# Patient Record
Sex: MALE | Race: WHITE | URBAN - METROPOLITAN AREA
[De-identification: names, ages, dates, MRNs, and addresses within clinical notes are randomized per-mention and may not be internally consistent; named-entity substitution may affect disease eponyms.]

---
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
## 2024-02-02 ENCOUNTER — HOSPITAL ENCOUNTER (EMERGENCY)
Age: 19
Discharge: HOME | End: 2024-02-02
Attending: EMERGENCY MEDICINE
Payer: COMMERCIAL

## 2024-02-02 VITALS
TEMPERATURE: 97 F | WEIGHT: 190 LBS | BODY MASS INDEX: 26.6 KG/M2 | HEIGHT: 71 IN | OXYGEN SATURATION: 99 % | RESPIRATION RATE: 16 BRPM | SYSTOLIC BLOOD PRESSURE: 135 MMHG | DIASTOLIC BLOOD PRESSURE: 72 MMHG | HEART RATE: 89 BPM

## 2024-02-02 DIAGNOSIS — S51.811A FOREARM LACERATION, RIGHT, INITIAL ENCOUNTER: Primary | ICD-10-CM

## 2024-02-02 PROCEDURE — 2500000003 HC RX 250 WO HCPCS: Performed by: EMERGENCY MEDICINE

## 2024-02-02 PROCEDURE — 99282 EMERGENCY DEPT VISIT SF MDM: CPT

## 2024-02-02 PROCEDURE — 12001 RPR S/N/AX/GEN/TRNK 2.5CM/<: CPT

## 2024-02-02 RX ORDER — LIDOCAINE HYDROCHLORIDE 10 MG/ML
5 INJECTION, SOLUTION EPIDURAL; INFILTRATION; INTRACAUDAL; PERINEURAL ONCE
Status: COMPLETED | OUTPATIENT
Start: 2024-02-02 | End: 2024-02-02

## 2024-02-02 RX ADMIN — LIDOCAINE HYDROCHLORIDE 5 ML: 10 INJECTION, SOLUTION EPIDURAL; INFILTRATION; INTRACAUDAL; PERINEURAL at 17:56

## 2024-02-02 ASSESSMENT — PAIN - FUNCTIONAL ASSESSMENT: PAIN_FUNCTIONAL_ASSESSMENT: NONE - DENIES PAIN

## 2024-02-12 ENCOUNTER — OFFICE VISIT (OUTPATIENT)
Dept: FAMILY MEDICINE CLINIC | Age: 19
End: 2024-02-12
Payer: COMMERCIAL

## 2024-02-12 VITALS
SYSTOLIC BLOOD PRESSURE: 130 MMHG | WEIGHT: 189.2 LBS | OXYGEN SATURATION: 99 % | HEART RATE: 82 BPM | TEMPERATURE: 96.6 F | DIASTOLIC BLOOD PRESSURE: 82 MMHG | BODY MASS INDEX: 26.39 KG/M2

## 2024-02-12 DIAGNOSIS — S51.811S LACERATION OF RIGHT FOREARM, SEQUELA: Primary | ICD-10-CM

## 2024-02-12 DIAGNOSIS — Z48.89 SUTURE CHECK: ICD-10-CM

## 2024-02-12 PROCEDURE — 99213 OFFICE O/P EST LOW 20 MIN: CPT | Performed by: FAMILY MEDICINE
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
## 2024-04-22 ENCOUNTER — TELEPHONE (OUTPATIENT)
Dept: FAMILY MEDICINE CLINIC | Age: 19
End: 2024-04-22

## 2024-04-22 ENCOUNTER — OFFICE VISIT (OUTPATIENT)
Dept: FAMILY MEDICINE CLINIC | Age: 19
End: 2024-04-22
Payer: COMMERCIAL

## 2024-04-22 VITALS
SYSTOLIC BLOOD PRESSURE: 118 MMHG | BODY MASS INDEX: 26.03 KG/M2 | WEIGHT: 186.6 LBS | OXYGEN SATURATION: 99 % | HEART RATE: 100 BPM | DIASTOLIC BLOOD PRESSURE: 72 MMHG

## 2024-04-22 DIAGNOSIS — L02.419 AXILLARY ABSCESS: Primary | ICD-10-CM

## 2024-04-22 DIAGNOSIS — B35.3 TINEA PEDIS OF RIGHT FOOT: ICD-10-CM

## 2024-04-22 PROCEDURE — 99214 OFFICE O/P EST MOD 30 MIN: CPT | Performed by: FAMILY MEDICINE

## 2024-04-22 RX ORDER — DOXYCYCLINE HYCLATE 100 MG
100 TABLET ORAL 2 TIMES DAILY
COMMUNITY
Start: 2024-04-21

## 2024-04-22 NOTE — PROGRESS NOTES
Reviewed intake note.  SUBJECTIVE:  Jaguar is a 18 y.o. male who presents with erythema, pain, and drainage.   Location: Right axilla  Onset: acute   Duration: 1 week and symptoms are worsening   Associated symptoms: none   Recent treatment: antibiotics doxycycline started in urgent care yesterday  Functional status affected: yes  Lesion started draining this morning.    Patient also with athlete's foot right foot.  Started using Tinactin yesterday    Allergies: Patient has no known allergies.  There is no problem list on file for this patient.      OBJECTIVE:  APPEARANCE: Alert, oriented, no acute distress   CARDIOVASCULAR: regular rate and rhythm, no murmurs   RESPIRATORY: clear to auscultation, no wheezes or rales, and unlabored breathing   LESION SIZE/LOCATION: Right axilla 1.5 x 3 cm  LESION DESCRIPTION: erythema, discharge, swelling, and fluctuant   ASSOCIATED SIGNS: none   SYSTEMIC SYMPTOMS: None  PULSES: peripheral pulses symmetrical   CAPILLARY REFILL: Normal     Right foot with erythematous scaly rash between toes    A:    Diagnosis Orders   1. Axillary abscess        2. Tinea pedis of right foot          P: Continue doxycycline until gone.  Warm compresses to keep drainage occurring.  If symptoms do not improve will come back and I will further incise  Continue over-the-counter athlete's foot medication for foot.  Follow-up with me as needed

## 2024-06-20 ENCOUNTER — OFFICE VISIT (OUTPATIENT)
Dept: FAMILY MEDICINE CLINIC | Age: 19
End: 2024-06-20
Payer: COMMERCIAL

## 2024-06-20 DIAGNOSIS — R68.82 DECREASED LIBIDO: ICD-10-CM

## 2024-06-20 DIAGNOSIS — R53.83 FATIGUE, UNSPECIFIED TYPE: ICD-10-CM

## 2024-06-20 DIAGNOSIS — F41.8 DEPRESSION WITH ANXIETY: Primary | ICD-10-CM

## 2024-06-20 PROCEDURE — 99214 OFFICE O/P EST MOD 30 MIN: CPT | Performed by: FAMILY MEDICINE

## 2024-06-21 ENCOUNTER — NURSE ONLY (OUTPATIENT)
Dept: FAMILY MEDICINE CLINIC | Age: 19
End: 2024-06-21

## 2024-06-21 VITALS
DIASTOLIC BLOOD PRESSURE: 84 MMHG | WEIGHT: 186.8 LBS | BODY MASS INDEX: 26.05 KG/M2 | HEART RATE: 74 BPM | SYSTOLIC BLOOD PRESSURE: 136 MMHG

## 2024-06-21 DIAGNOSIS — F41.8 DEPRESSION WITH ANXIETY: ICD-10-CM

## 2024-06-21 DIAGNOSIS — R53.83 FATIGUE, UNSPECIFIED TYPE: Primary | ICD-10-CM

## 2024-06-21 SDOH — ECONOMIC STABILITY: FOOD INSECURITY: WITHIN THE PAST 12 MONTHS, YOU WORRIED THAT YOUR FOOD WOULD RUN OUT BEFORE YOU GOT MONEY TO BUY MORE.: NEVER TRUE

## 2024-06-21 SDOH — ECONOMIC STABILITY: INCOME INSECURITY: HOW HARD IS IT FOR YOU TO PAY FOR THE VERY BASICS LIKE FOOD, HOUSING, MEDICAL CARE, AND HEATING?: NOT HARD AT ALL

## 2024-06-21 SDOH — ECONOMIC STABILITY: FOOD INSECURITY: WITHIN THE PAST 12 MONTHS, THE FOOD YOU BOUGHT JUST DIDN'T LAST AND YOU DIDN'T HAVE MONEY TO GET MORE.: NEVER TRUE

## 2024-06-21 ASSESSMENT — PATIENT HEALTH QUESTIONNAIRE - PHQ9
6. FEELING BAD ABOUT YOURSELF - OR THAT YOU ARE A FAILURE OR HAVE LET YOURSELF OR YOUR FAMILY DOWN: MORE THAN HALF THE DAYS
SUM OF ALL RESPONSES TO PHQ QUESTIONS 1-9: 13
SUM OF ALL RESPONSES TO PHQ QUESTIONS 1-9: 13
5. POOR APPETITE OR OVEREATING: NOT AT ALL
8. MOVING OR SPEAKING SO SLOWLY THAT OTHER PEOPLE COULD HAVE NOTICED. OR THE OPPOSITE, BEING SO FIGETY OR RESTLESS THAT YOU HAVE BEEN MOVING AROUND A LOT MORE THAN USUAL: NOT AT ALL
2. FEELING DOWN, DEPRESSED OR HOPELESS: NEARLY EVERY DAY
10. IF YOU CHECKED OFF ANY PROBLEMS, HOW DIFFICULT HAVE THESE PROBLEMS MADE IT FOR YOU TO DO YOUR WORK, TAKE CARE OF THINGS AT HOME, OR GET ALONG WITH OTHER PEOPLE: VERY DIFFICULT
1. LITTLE INTEREST OR PLEASURE IN DOING THINGS: NEARLY EVERY DAY
SUM OF ALL RESPONSES TO PHQ9 QUESTIONS 1 & 2: 6
SUM OF ALL RESPONSES TO PHQ QUESTIONS 1-9: 11
4. FEELING TIRED OR HAVING LITTLE ENERGY: MORE THAN HALF THE DAYS
SUM OF ALL RESPONSES TO PHQ QUESTIONS 1-9: 13
9. THOUGHTS THAT YOU WOULD BE BETTER OFF DEAD, OR OF HURTING YOURSELF: MORE THAN HALF THE DAYS
7. TROUBLE CONCENTRATING ON THINGS, SUCH AS READING THE NEWSPAPER OR WATCHING TELEVISION: SEVERAL DAYS

## 2024-06-21 ASSESSMENT — COLUMBIA-SUICIDE SEVERITY RATING SCALE - C-SSRS
2. HAVE YOU ACTUALLY HAD ANY THOUGHTS OF KILLING YOURSELF?: NO
1. WITHIN THE PAST MONTH, HAVE YOU WISHED YOU WERE DEAD OR WISHED YOU COULD GO TO SLEEP AND NOT WAKE UP?: NO
6. HAVE YOU EVER DONE ANYTHING, STARTED TO DO ANYTHING, OR PREPARED TO DO ANYTHING TO END YOUR LIFE?: NO

## 2024-06-21 NOTE — PROGRESS NOTES
Patient came into office for lab work Testosterone, TSH, CMP. Venipuncture performed on patients right antecubital space using a straight needle to draw 2 SST. Patient tolerated well, no complications.   
[facility]

## 2024-06-21 NOTE — PROGRESS NOTES
Subjective:       Jaguar Menjivar is a 18 y.o. male who presents for follow up of depression. Current symptoms include anhedonia, depressed mood, fatigue, hopelessness, recurrent thoughts of death, and decreased libido . Symptoms have been gradually worsening since that time. Patient denies insomnia, suicidal thoughts with specific plan, suicidal thoughts without plan, weight gain, and weight loss. Previous treatment includes: individual therapy. He complains of the following side effects from the treatment: none.    Patient's medications, allergies, past medical, surgical, social and family histories were reviewed and updated as appropriate.    Review of Systems  ROS:  Energy level fair overall, and weight is stable.  No chest pain or shortness of breath.  Bowels have been normal without constipation or diarrhea.  No shakes or tremors.       Objective:      /84   Pulse 74   Wt 84.7 kg (186 lb 12.8 oz)   BMI 26.05 kg/m²    General:  alert, appears stated age, and cooperative   Neck: Thyroid not palpable, not enlarged, no nodules detected.    Chest: clear with no wheezes or rales.  No retractions, or use of accessory muscles noted.   Cardiovascular: PMI is not displaced, and no thrill noted.  Regular rate and rhythm with no rub, murmur or gallop.  No peripheral edema.  Pedal pulses are normal.    Affect & Behavior:  good grooming, good insight, normal perception, normal reasoning, normal speech pattern and content, and normal thought patterns flattened affect       Assessment:      Diagnosis Orders   1. Depression with anxiety  TSH   Needs improvement   2. Fatigue, unspecified type  Testosterone   Likely due to depression TSH    Comprehensive Metabolic Panel      3. Decreased libido  Testosterone   Likely due to depression TSH    Comprehensive Metabolic Panel              Plan:   Lexapro 10 mg daily #30 refill 1 sent to the pharmacy   Continue with counseling.  Reviewed concept of anxiety as biochemical

## 2024-06-22 LAB
ALBUMIN SERPL-MCNC: 4.5 G/DL (ref 3.4–5)
ALBUMIN/GLOB SERPL: 2 {RATIO} (ref 1.1–2.2)
ALP SERPL-CCNC: 47 U/L (ref 40–129)
ALT SERPL-CCNC: 18 U/L (ref 10–40)
ANION GAP SERPL CALCULATED.3IONS-SCNC: 17 MMOL/L (ref 3–16)
AST SERPL-CCNC: 15 U/L (ref 15–37)
BILIRUB SERPL-MCNC: 0.6 MG/DL (ref 0–1)
BUN SERPL-MCNC: 20 MG/DL (ref 7–20)
CALCIUM SERPL-MCNC: 9.6 MG/DL (ref 8.3–10.6)
CHLORIDE SERPL-SCNC: 104 MMOL/L (ref 99–110)
CO2 SERPL-SCNC: 21 MMOL/L (ref 21–32)
CREAT SERPL-MCNC: 1 MG/DL (ref 0.9–1.3)
GFR SERPLBLD CREATININE-BSD FMLA CKD-EPI: >90 ML/MIN/{1.73_M2}
GLUCOSE SERPL-MCNC: 98 MG/DL (ref 70–99)
POTASSIUM SERPL-SCNC: 4.3 MMOL/L (ref 3.5–5.1)
PROT SERPL-MCNC: 6.8 G/DL (ref 6.4–8.2)
SODIUM SERPL-SCNC: 142 MMOL/L (ref 136–145)
TSH SERPL DL<=0.005 MIU/L-ACNC: 4.26 UIU/ML (ref 0.43–4)

## 2024-06-25 LAB — TESTOST SERPL-MCNC: 589 NG/DL (ref 300–1080)

## 2024-06-26 DIAGNOSIS — Z01.89 PATIENT REQUEST FOR DIAGNOSTIC TESTING: Primary | ICD-10-CM

## 2024-06-26 DIAGNOSIS — E03.4 HYPOTHYROIDISM DUE TO ACQUIRED ATROPHY OF THYROID: Primary | ICD-10-CM

## 2024-06-26 RX ORDER — LEVOTHYROXINE SODIUM 0.05 MG/1
50 TABLET ORAL DAILY
Qty: 90 TABLET | Refills: 1 | Status: SHIPPED | OUTPATIENT
Start: 2024-06-26

## 2024-08-14 ENCOUNTER — LAB (OUTPATIENT)
Dept: FAMILY MEDICINE CLINIC | Age: 19
End: 2024-08-14
Payer: COMMERCIAL

## 2024-08-14 DIAGNOSIS — E03.4 HYPOTHYROIDISM DUE TO ACQUIRED ATROPHY OF THYROID: ICD-10-CM

## 2024-08-14 LAB — TSH SERPL DL<=0.005 MIU/L-ACNC: 1.47 UIU/ML (ref 0.43–4)

## 2024-08-14 PROCEDURE — 36415 COLL VENOUS BLD VENIPUNCTURE: CPT | Performed by: FAMILY MEDICINE

## 2024-08-14 NOTE — PROGRESS NOTES
Pt came in for blood work today. I used a straight needle. I drew from the patients right antecubital space I drew up two SST tubes. Pt tolerated well.

## 2024-08-17 LAB — MISCELLANEOUS LAB TEST ORDER: NORMAL

## 2024-08-30 ENCOUNTER — TELEPHONE (OUTPATIENT)
Dept: FAMILY MEDICINE CLINIC | Age: 19
End: 2024-08-30

## 2024-08-30 RX ORDER — ESCITALOPRAM OXALATE 10 MG/1
10 TABLET ORAL DAILY
Qty: 30 TABLET | OUTPATIENT
Start: 2024-08-30

## 2024-08-30 NOTE — TELEPHONE ENCOUNTER
Patient is requesting refill of Levothyroxine and Lexapro 10 mg be sent to [facility] in [place] due to [facility] closing. The Lexapro was started on 06/20/2024 when the computer systems were down and he was given a paper prescription.

## 2024-09-03 DIAGNOSIS — E03.4 HYPOTHYROIDISM DUE TO ACQUIRED ATROPHY OF THYROID: ICD-10-CM

## 2024-09-03 RX ORDER — ESCITALOPRAM OXALATE 10 MG/1
10 TABLET ORAL DAILY
Qty: 30 TABLET | Refills: 0 | Status: SHIPPED | OUTPATIENT
Start: 2024-09-03

## 2024-09-03 RX ORDER — ESCITALOPRAM OXALATE 10 MG/1
10 TABLET ORAL DAILY
Qty: 30 TABLET | OUTPATIENT
Start: 2024-09-03

## 2024-09-03 RX ORDER — LEVOTHYROXINE SODIUM 50 UG/1
50 TABLET ORAL DAILY
Qty: 30 TABLET | Refills: 0 | Status: SHIPPED | OUTPATIENT
Start: 2024-09-03

## 2024-09-16 ENCOUNTER — OFFICE VISIT (OUTPATIENT)
Dept: FAMILY MEDICINE CLINIC | Age: 19
End: 2024-09-16
Payer: COMMERCIAL

## 2024-09-16 VITALS
SYSTOLIC BLOOD PRESSURE: 136 MMHG | OXYGEN SATURATION: 99 % | HEART RATE: 93 BPM | TEMPERATURE: 97.3 F | DIASTOLIC BLOOD PRESSURE: 72 MMHG

## 2024-09-16 DIAGNOSIS — J06.9 VIRAL URI: Primary | ICD-10-CM

## 2024-09-16 PROCEDURE — 99213 OFFICE O/P EST LOW 20 MIN: CPT | Performed by: FAMILY MEDICINE

## 2024-09-16 RX ORDER — BENZONATATE 200 MG/1
200 CAPSULE ORAL 3 TIMES DAILY PRN
Qty: 30 CAPSULE | Refills: 0 | Status: SHIPPED | OUTPATIENT
Start: 2024-09-16

## 2024-09-16 RX ORDER — DEXTROMETHORPHAN HYDROBROMIDE AND PROMETHAZINE HYDROCHLORIDE 15; 6.25 MG/5ML; MG/5ML
5 SYRUP ORAL 4 TIMES DAILY PRN
Qty: 240 ML | Refills: 0 | Status: SHIPPED | OUTPATIENT
Start: 2024-09-16

## 2024-09-19 ENCOUNTER — OFFICE VISIT (OUTPATIENT)
Dept: FAMILY MEDICINE CLINIC | Age: 19
End: 2024-09-19
Payer: COMMERCIAL

## 2024-09-19 VITALS
HEART RATE: 79 BPM | WEIGHT: 189.4 LBS | BODY MASS INDEX: 26.42 KG/M2 | SYSTOLIC BLOOD PRESSURE: 124 MMHG | TEMPERATURE: 97.5 F | OXYGEN SATURATION: 98 % | DIASTOLIC BLOOD PRESSURE: 72 MMHG

## 2024-09-19 DIAGNOSIS — F41.8 DEPRESSION WITH ANXIETY: Primary | ICD-10-CM

## 2024-09-19 DIAGNOSIS — E03.4 HYPOTHYROIDISM DUE TO ACQUIRED ATROPHY OF THYROID: ICD-10-CM

## 2024-09-19 PROCEDURE — 99214 OFFICE O/P EST MOD 30 MIN: CPT | Performed by: FAMILY MEDICINE

## 2024-09-19 RX ORDER — ESCITALOPRAM OXALATE 10 MG/1
10 TABLET ORAL DAILY
Qty: 30 TABLET | Refills: 5 | Status: SHIPPED | OUTPATIENT
Start: 2024-09-19

## 2024-09-19 RX ORDER — LEVOTHYROXINE SODIUM 50 UG/1
50 TABLET ORAL DAILY
Qty: 30 TABLET | Refills: 5 | Status: SHIPPED | OUTPATIENT
Start: 2024-09-19